# Patient Record
Sex: MALE | Race: WHITE | Employment: FULL TIME | URBAN - METROPOLITAN AREA
[De-identification: names, ages, dates, MRNs, and addresses within clinical notes are randomized per-mention and may not be internally consistent; named-entity substitution may affect disease eponyms.]

---

## 2018-01-03 ENCOUNTER — LAB ENCOUNTER (OUTPATIENT)
Dept: LAB | Age: 23
End: 2018-01-03
Attending: FAMILY MEDICINE
Payer: COMMERCIAL

## 2018-01-03 DIAGNOSIS — Z11.3 SCREEN FOR STD (SEXUALLY TRANSMITTED DISEASE): ICD-10-CM

## 2018-01-03 PROCEDURE — 87591 N.GONORRHOEAE DNA AMP PROB: CPT

## 2018-01-03 PROCEDURE — 87491 CHLMYD TRACH DNA AMP PROBE: CPT

## 2018-01-03 PROCEDURE — 87389 HIV-1 AG W/HIV-1&-2 AB AG IA: CPT | Performed by: FAMILY MEDICINE

## 2018-01-03 PROCEDURE — 86694 HERPES SIMPLEX NES ANTBDY: CPT | Performed by: FAMILY MEDICINE

## 2018-01-03 PROCEDURE — 36415 COLL VENOUS BLD VENIPUNCTURE: CPT | Performed by: FAMILY MEDICINE

## 2018-01-03 PROCEDURE — 86696 HERPES SIMPLEX TYPE 2 TEST: CPT | Performed by: FAMILY MEDICINE

## 2018-01-03 PROCEDURE — 86695 HERPES SIMPLEX TYPE 1 TEST: CPT | Performed by: FAMILY MEDICINE

## 2018-01-04 LAB
C TRACH DNA SPEC QL NAA+PROBE: NEGATIVE
N GONORRHOEA DNA SPEC QL NAA+PROBE: NEGATIVE

## 2023-08-07 PROBLEM — F10.10 ETOH ABUSE: Status: ACTIVE | Noted: 2023-08-07

## 2023-10-03 NOTE — PROGRESS NOTES
Result Note letter drafted/routed to [facility]. Glycopyrrolate Counseling:  I discussed with the patient the risks of glycopyrrolate including but not limited to skin rash, drowsiness, dry mouth, difficulty urinating, and blurred vision.

## 2025-02-18 PROBLEM — F10.20 ALCOHOL USE DISORDER, SEVERE, DEPENDENCE (HCC): Status: ACTIVE | Noted: 2025-02-18

## 2025-05-22 ENCOUNTER — HOSPITAL ENCOUNTER (EMERGENCY)
Facility: HOSPITAL | Age: 30
Discharge: HOME | End: 2025-05-22
Attending: EMERGENCY MEDICINE
Payer: COMMERCIAL

## 2025-05-22 ENCOUNTER — APPOINTMENT (OUTPATIENT)
Dept: ULTRASOUND IMAGING | Facility: HOSPITAL | Age: 30
End: 2025-05-22
Attending: EMERGENCY MEDICINE
Payer: COMMERCIAL

## 2025-05-22 ENCOUNTER — APPOINTMENT (OUTPATIENT)
Dept: GENERAL RADIOLOGY | Facility: HOSPITAL | Age: 30
End: 2025-05-22
Attending: EMERGENCY MEDICINE
Payer: COMMERCIAL

## 2025-05-22 VITALS
HEART RATE: 98 BPM | RESPIRATION RATE: 29 BRPM | DIASTOLIC BLOOD PRESSURE: 69 MMHG | BODY MASS INDEX: 27.88 KG/M2 | SYSTOLIC BLOOD PRESSURE: 122 MMHG | TEMPERATURE: 99 F | HEIGHT: 66 IN | OXYGEN SATURATION: 96 % | WEIGHT: 173.5 LBS

## 2025-05-22 DIAGNOSIS — F10.90 ALCOHOL USE DISORDER: Primary | ICD-10-CM

## 2025-05-22 DIAGNOSIS — K70.10 ALCOHOLIC HEPATITIS, UNSPECIFIED WHETHER ASCITES PRESENT (HCC): ICD-10-CM

## 2025-05-22 DIAGNOSIS — F18.90: ICD-10-CM

## 2025-05-22 DIAGNOSIS — D64.9 ANEMIA, UNSPECIFIED TYPE: ICD-10-CM

## 2025-05-22 LAB
ALBUMIN SERPL-MCNC: 4.3 G/DL (ref 3.2–4.8)
ALBUMIN/GLOB SERPL: 2 {RATIO} (ref 1–2)
ALP LIVER SERPL-CCNC: 145 U/L (ref 45–117)
ALT SERPL-CCNC: 426 U/L (ref 10–49)
ANION GAP SERPL CALC-SCNC: 11 MMOL/L (ref 0–18)
ANTIBODY SCREEN: NEGATIVE
APTT PPP: 24.4 SECONDS (ref 23–36)
AST SERPL-CCNC: 433 U/L (ref ?–34)
BASOPHILS # BLD AUTO: 0.02 X10(3) UL (ref 0–0.2)
BASOPHILS NFR BLD AUTO: 0.6 %
BILIRUB SERPL-MCNC: 0.6 MG/DL (ref 0.3–1.2)
BUN BLD-MCNC: 20 MG/DL (ref 9–23)
CALCIUM BLD-MCNC: 9.1 MG/DL (ref 8.7–10.6)
CHLORIDE SERPL-SCNC: 109 MMOL/L (ref 98–112)
CO2 SERPL-SCNC: 25 MMOL/L (ref 21–32)
CREAT BLD-MCNC: 0.85 MG/DL (ref 0.7–1.3)
DEPRECATED HBV CORE AB SER IA-ACNC: 8315 NG/ML (ref 50–336)
EGFRCR SERPLBLD CKD-EPI 2021: 119 ML/MIN/1.73M2 (ref 60–?)
EOSINOPHIL # BLD AUTO: 0.08 X10(3) UL (ref 0–0.7)
EOSINOPHIL NFR BLD AUTO: 2.3 %
ERYTHROCYTE [DISTWIDTH] IN BLOOD BY AUTOMATED COUNT: 22 %
GLOBULIN PLAS-MCNC: 2.2 G/DL (ref 2–3.5)
GLUCOSE BLD-MCNC: 97 MG/DL (ref 70–99)
HCT VFR BLD AUTO: 27.2 % (ref 39–53)
HGB BLD-MCNC: 9.3 G/DL (ref 13–17.5)
HGB RETIC QN AUTO: 38.9 PG (ref 28.2–36.6)
IMM GRANULOCYTES # BLD AUTO: 0.03 X10(3) UL (ref 0–1)
IMM GRANULOCYTES NFR BLD: 0.9 %
IMM RETICS NFR: 0.28 RATIO (ref 0.1–0.3)
INR BLD: 1.04 (ref 0.8–1.2)
IRON SATN MFR SERPL: 93 % (ref 20–50)
IRON SERPL-MCNC: 249 UG/DL (ref 65–175)
LIPASE SERPL-CCNC: 32 U/L (ref 12–53)
LYMPHOCYTES # BLD AUTO: 1.55 X10(3) UL (ref 1–4)
LYMPHOCYTES NFR BLD AUTO: 44 %
MCH RBC QN AUTO: 34.2 PG (ref 26–34)
MCHC RBC AUTO-ENTMCNC: 34.2 G/DL (ref 31–37)
MCV RBC AUTO: 100 FL (ref 80–100)
MONOCYTES # BLD AUTO: 0.26 X10(3) UL (ref 0.1–1)
MONOCYTES NFR BLD AUTO: 7.4 %
NEUTROPHILS # BLD AUTO: 1.58 X10 (3) UL (ref 1.5–7.7)
NEUTROPHILS # BLD AUTO: 1.58 X10(3) UL (ref 1.5–7.7)
NEUTROPHILS NFR BLD AUTO: 44.8 %
OSMOLALITY SERPL CALC.SUM OF ELEC: 303 MOSM/KG (ref 275–295)
PLATELET # BLD AUTO: 205 10(3)UL (ref 150–450)
POTASSIUM SERPL-SCNC: 4.1 MMOL/L (ref 3.5–5.1)
PROT SERPL-MCNC: 6.5 G/DL (ref 5.7–8.2)
PROTHROMBIN TIME: 13.7 SECONDS (ref 11.6–14.8)
RBC # BLD AUTO: 2.72 X10(6)UL (ref 4.3–5.7)
RETICS # AUTO: 122.5 X10(3) UL (ref 22.5–147.5)
RETICS/RBC NFR AUTO: 4.4 % (ref 0.5–2.5)
RH BLOOD TYPE: NEGATIVE
RH BLOOD TYPE: NEGATIVE
SODIUM SERPL-SCNC: 145 MMOL/L (ref 136–145)
TOTAL IRON BINDING CAPACITY: 267 UG/DL (ref 250–425)
TRANSFERRIN SERPL-MCNC: 198 MG/DL (ref 215–365)
WBC # BLD AUTO: 3.5 X10(3) UL (ref 4–11)

## 2025-05-22 PROCEDURE — 93005 ELECTROCARDIOGRAM TRACING: CPT

## 2025-05-22 PROCEDURE — 83540 ASSAY OF IRON: CPT | Performed by: EMERGENCY MEDICINE

## 2025-05-22 PROCEDURE — 80053 COMPREHEN METABOLIC PANEL: CPT | Performed by: EMERGENCY MEDICINE

## 2025-05-22 PROCEDURE — 83550 IRON BINDING TEST: CPT | Performed by: EMERGENCY MEDICINE

## 2025-05-22 PROCEDURE — 86850 RBC ANTIBODY SCREEN: CPT | Performed by: EMERGENCY MEDICINE

## 2025-05-22 PROCEDURE — 85025 COMPLETE CBC W/AUTO DIFF WBC: CPT | Performed by: EMERGENCY MEDICINE

## 2025-05-22 PROCEDURE — 36415 COLL VENOUS BLD VENIPUNCTURE: CPT

## 2025-05-22 PROCEDURE — 85730 THROMBOPLASTIN TIME PARTIAL: CPT | Performed by: EMERGENCY MEDICINE

## 2025-05-22 PROCEDURE — 86901 BLOOD TYPING SEROLOGIC RH(D): CPT | Performed by: EMERGENCY MEDICINE

## 2025-05-22 PROCEDURE — 86900 BLOOD TYPING SEROLOGIC ABO: CPT | Performed by: EMERGENCY MEDICINE

## 2025-05-22 PROCEDURE — 93010 ELECTROCARDIOGRAM REPORT: CPT

## 2025-05-22 PROCEDURE — 85610 PROTHROMBIN TIME: CPT | Performed by: EMERGENCY MEDICINE

## 2025-05-22 PROCEDURE — 83690 ASSAY OF LIPASE: CPT | Performed by: EMERGENCY MEDICINE

## 2025-05-22 PROCEDURE — 71045 X-RAY EXAM CHEST 1 VIEW: CPT | Performed by: EMERGENCY MEDICINE

## 2025-05-22 PROCEDURE — 93971 EXTREMITY STUDY: CPT | Performed by: EMERGENCY MEDICINE

## 2025-05-22 PROCEDURE — 99285 EMERGENCY DEPT VISIT HI MDM: CPT

## 2025-05-22 PROCEDURE — 85045 AUTOMATED RETICULOCYTE COUNT: CPT | Performed by: EMERGENCY MEDICINE

## 2025-05-22 PROCEDURE — 82728 ASSAY OF FERRITIN: CPT | Performed by: EMERGENCY MEDICINE

## 2025-05-22 NOTE — ED QUICK NOTES
Pt received lying in bed. Family at bedside. Dr. Bowden in room and informed pt of his lab results.

## 2025-05-22 NOTE — DISCHARGE INSTRUCTIONS
Stop using alcohol and nitrous oxide.    Follow a low-sodium diet.    Start taking vitamins with iron every day    Return if lightheadedness or worsening weakness develops    Make an appointment with the hematologist to get their opinion on why you are so anemic.

## 2025-05-22 NOTE — ED PROVIDER NOTES
Patient Seen in: [facility] Emergency Department      History     Chief Complaint   Patient presents with    Swelling Edema     Stated Complaint: swelling on hands and feet. started about three days ago    Subjective:     HPI    30-year-old male with alcohol use disorder.  He also abuses nitrous oxide.  He swelling in the ankles, with one appearing worse than the other. The swelling was first noticed at work on Friday, prompting a visit to a family practice doctor. The doctor suggested the swelling might be due to heat and excessive salt intake. The patient also mentions using whippets and consuming alcohol. There is no report of fever, vomiting, chest pain, or shortness of breath. The patient has a history of a chronic anal fissure.    Patient recently diagnosed with anemia.  His hemoglobin was slightly low at 12 last month.  He saw gastroenterologist and no specific diagnosis other than anal fissure was made.    Objective:   Past Medical History:    Alcohol abuse    Asthma              History reviewed. No pertinent surgical history.             Social History     Socioeconomic History    Marital status: Single   Tobacco Use    Smoking status: Every Day     Current packs/day: 1.00     Average packs/day: 1 pack/day for 10.0 years (10.0 ttl pk-yrs)     Types: Cigarettes    Smokeless tobacco: Never   Vaping Use    Vaping status: Never Used   Substance and Sexual Activity    Alcohol use: Not Currently     Comment: Last drink 2/17/25. Reports drinking a 1/2 - 1 pint of malt liquor daily for approximately 12 years. He also drinks tall boys and airplane bottle sizes but what he drinks varies on a daily basis. Denies a history of DT's or withdrawal seizures.    Drug use: Not Currently     Types: Cannabis, Cocaine     Comment: Last use of cannabis, \"I can't even tell you.\" Last use of cocaine [place] 2023.     Social Drivers of Health     Food Insecurity: No Food Insecurity (2/19/2025)    NCSS - Food Insecurity      Worried About Running Out of Food in the Last Year: No     Ran Out of Food in the Last Year: No   Transportation Needs: No Transportation Needs (2/19/2025)    NCSS - Transportation     Lack of Transportation: No   Housing Stability: Not At Risk (2/19/2025)    NCSS - Housing/Utilities     Has Housing: Yes     Worried About Losing Housing: No     Unable to Get Utilities: No              Review of Systems    Positive for stated complaint: swelling on hands and feet. started about three days ago  Other systems are as noted in HPI.  Constitutional and vital signs reviewed.      All other systems reviewed and negative except as noted above.    Physical Exam     ED Triage Vitals   BP 05/22/25 0545 133/75   Pulse 05/22/25 0545 114   Resp 05/22/25 0545 24   Temp 05/22/25 0551 98.7 °F (37.1 °C)   Temp src 05/22/25 0551 Oral   SpO2 05/22/25 0545 98 %   O2 Device 05/22/25 0545 None (Room air)       Current:/70   Pulse 107   Temp 98.7 °F (37.1 °C) (Oral)   Resp 24   Ht 167.6 cm (5' 6\")   Wt 78.7 kg   SpO2 98%   BMI 28.00 kg/m²     General:  Vitals as listed.  No acute distress   HEENT: Sclerae anicteric.  Conjunctivae show no pallor.  Oropharynx clear, mucous membranes moist   Lungs: good air exchange  Abdomen: Soft and nontender.  No abdominal masses.  No peritoneal signs  Rectal: No external bleeding.  Digital exam showed no blood in his stool.  Extremities: no edema, normal peripheral pulses   Neuro: Alert oriented and nonfocal  Vitals:    05/22/25 0600 05/22/25 0700 05/22/25 0730 05/22/25 0800   BP: 122/70 116/70 127/70 122/69   Pulse: 107 102 105 98   Resp: 24 26 25 (!) 29   Temp:       TempSrc:       SpO2: 98% 99% 95% 96%   Weight:       Height:             ED Course     Labs Reviewed   COMP METABOLIC PANEL (14) - Abnormal; Notable for the following components:       Result Value    Calculated Osmolality 303 (*)      (*)      (*)     Alkaline Phosphatase 145 (*)     All other components within  normal limits   CBC WITH DIFFERENTIAL WITH PLATELET - Abnormal; Notable for the following components:    WBC 3.5 (*)     RBC 2.72 (*)     HGB 9.3 (*)     HCT 27.2 (*)     MCH 34.2 (*)     All other components within normal limits   LIPASE - Normal   SCAN SLIDE   RETICULOCYTE COUNT   PROTHROMBIN TIME (PT)   PTT, ACTIVATED   TYPE AND SCREEN    Narrative:     The following orders were created for panel order Type and screen.  Procedure                               Abnormality         Status                     ---------                               -----------         ------                     ABORH (Blood Type)[[id number]]                                                          Antibody Screen[[id number]]                                                               Please view results for these tests on the individual orders.   ABORH (BLOOD TYPE)   ANTIBODY SCREEN   RAINBOW DRAW LAVENDER   RAINBOW DRAW LIGHT GREEN   RAINBOW DRAW BLUE   RAINBOW DRAW GOLD     XR CHEST AP PORTABLE  (CPT=71045)  Result Date: 5/22/2025  CONCLUSION:  Shallow inspiration.  No evidence of active cardiopulmonary disease.   LOCATION:  [facility]      Dictated by (CST): Moises Calixto MD on 5/22/2025 at 6:43 AM     Finalized by (CST): Moises Calixto MD on 5/22/2025 at 6:45 AM       I independently read the radiographs and no pulmonary edema on chest x-ray  EKG    Rate, intervals and axes as noted on EKG Report.  Rate: 112  Rhythm: Sinus Rhythm  Reading: No acute ST-T changes           ED COURSE and MDM       Type and screen sent because patient's hemoglobin is 9.3.  Most recent prior hemoglobin was 12.43 weeks ago at [facility].  He was seen by Dr. German, gastroenterologist at [facility], on 5/1/2025 and diagnosed with anal fissure.  Put on stool softeners.    Sources of the medical history included the patient and his father    I reviewed prior external notes including evaluation by family practice a [facility] on 5/16/2025 for dry skin and ankle  swelling.    Advised to stop drinking and to stop using nitrous oxide.  He is to follow-up with hematologist and gastroenterologist and primary care physician.  He should start a multivitamin with iron.    I have discussed with the patient the results of testing, differential diagnosis, and treatment plan. They expressed clear understanding of these instructions and agrees to the plan provided.    Disposition and Plan     Clinical Impression:  1. Alcohol use disorder    2. Nitrous oxide user    3. Alcoholic hepatitis, unspecified whether ascites present (HCC)    4. Anemia, unspecified type         Disposition:  There is no disposition on file for this visit.  There is no disposition time on file for this visit.    Follow-up:  Deepak Middleton MD  [address] [place] [id number] [phone]    Schedule an appointment as soon as possible for a visit in 3 day(s)          Medications Prescribed:  Current Discharge Medication List

## 2025-05-22 NOTE — ED INITIAL ASSESSMENT (HPI)
Pt to ED for c/o swelling to hands and feet x 2 days with shortness of breath. Pt also states having \"anal fissure\" x 1 month and has been inhaling \"nitrous\" to help ease up with the pain. Pt admits drinking ETOH daily for the last 2 wks last drink 2000.

## 2025-05-23 LAB
ATRIAL RATE: 112 BPM
P AXIS: 45 DEGREES
P-R INTERVAL: 124 MS
Q-T INTERVAL: 328 MS
QRS DURATION: 84 MS
QTC CALCULATION (BEZET): 447 MS
R AXIS: 16 DEGREES
T AXIS: 25 DEGREES
VENTRICULAR RATE: 112 BPM

## (undated) NOTE — LETTER
May 22, 2025    Patient: Maxim Wolfe   Date of Visit: 5/22/2025       To Whom It May Concern:    Maxim Wolfe was seen and treated in our emergency department on 5/22/2025. He should not return to work until 5/26/25.    If you have any questions or concerns, please don't hesitate to call.       Encounter Provider(s):    Serg Bowden MD